# Patient Record
Sex: MALE | Race: WHITE | Employment: FULL TIME | URBAN - METROPOLITAN AREA
[De-identification: names, ages, dates, MRNs, and addresses within clinical notes are randomized per-mention and may not be internally consistent; named-entity substitution may affect disease eponyms.]

---

## 2018-08-07 PROCEDURE — 93005 ELECTROCARDIOGRAM TRACING: CPT | Performed by: EMERGENCY MEDICINE

## 2018-08-07 PROCEDURE — 96360 HYDRATION IV INFUSION INIT: CPT | Performed by: EMERGENCY MEDICINE

## 2018-08-07 PROCEDURE — 99284 EMERGENCY DEPT VISIT MOD MDM: CPT | Mod: 25 | Performed by: EMERGENCY MEDICINE

## 2018-08-07 PROCEDURE — 96361 HYDRATE IV INFUSION ADD-ON: CPT | Performed by: EMERGENCY MEDICINE

## 2018-08-07 PROCEDURE — 93010 ELECTROCARDIOGRAM REPORT: CPT | Mod: Z6 | Performed by: EMERGENCY MEDICINE

## 2018-08-07 RX ORDER — MULTIVITAMIN,THERAPEUTIC
1 TABLET ORAL DAILY
COMMUNITY

## 2018-08-07 RX ORDER — DIMENHYDRINATE 50 MG
1 TABLET ORAL DAILY
COMMUNITY

## 2018-08-08 ENCOUNTER — HOSPITAL ENCOUNTER (EMERGENCY)
Facility: CLINIC | Age: 28
Discharge: HOME | End: 2018-08-08
Attending: EMERGENCY MEDICINE | Admitting: EMERGENCY MEDICINE
Payer: COMMERCIAL

## 2018-08-08 VITALS
DIASTOLIC BLOOD PRESSURE: 85 MMHG | OXYGEN SATURATION: 97 % | SYSTOLIC BLOOD PRESSURE: 124 MMHG | HEART RATE: 90 BPM | HEIGHT: 73 IN | RESPIRATION RATE: 20 BRPM | TEMPERATURE: 98.7 F

## 2018-08-08 DIAGNOSIS — R07.9 ACUTE CHEST PAIN: ICD-10-CM

## 2018-08-08 DIAGNOSIS — R42 DIZZINESS: ICD-10-CM

## 2018-08-08 LAB
ANION GAP BLD CALC-SCNC: 6 MMOL/L (ref 6–17)
BASOPHILS # BLD AUTO: 0 10E9/L (ref 0–0.2)
BASOPHILS NFR BLD AUTO: 0.1 %
BUN SERPL-MCNC: 16 MG/DL (ref 7–30)
CALCIUM SERPL-MCNC: 9.3 MG/DL (ref 8.5–10.1)
CHLORIDE BLD-SCNC: 105 MMOL/L (ref 94–109)
CO2 BLD-SCNC: 30 MMOL/L (ref 20–32)
CREAT SERPL-MCNC: 0.97 MG/DL (ref 0.66–1.25)
DIFFERENTIAL METHOD BLD: NORMAL
EOSINOPHIL # BLD AUTO: 0.1 10E9/L (ref 0–0.7)
EOSINOPHIL NFR BLD AUTO: 1.7 %
ERYTHROCYTE [DISTWIDTH] IN BLOOD BY AUTOMATED COUNT: 12.8 % (ref 10–15)
GFR SERPL CREATININE-BSD FRML MDRD: >90 ML/MIN/1.7M2
GLUCOSE BLD-MCNC: 105 MG/DL (ref 70–99)
HCT VFR BLD AUTO: 44 % (ref 40–53)
HGB BLD-MCNC: 14.9 G/DL (ref 13.3–17.7)
IMM GRANULOCYTES # BLD: 0 10E9/L (ref 0–0.4)
IMM GRANULOCYTES NFR BLD: 0.3 %
INTERPRETATION ECG - MUSE: NORMAL
LYMPHOCYTES # BLD AUTO: 2.5 10E9/L (ref 0.8–5.3)
LYMPHOCYTES NFR BLD AUTO: 32.8 %
MCH RBC QN AUTO: 28.1 PG (ref 26.5–33)
MCHC RBC AUTO-ENTMCNC: 33.9 G/DL (ref 31.5–36.5)
MCV RBC AUTO: 83 FL (ref 78–100)
MONOCYTES # BLD AUTO: 0.5 10E9/L (ref 0–1.3)
MONOCYTES NFR BLD AUTO: 6.6 %
NEUTROPHILS # BLD AUTO: 4.4 10E9/L (ref 1.6–8.3)
NEUTROPHILS NFR BLD AUTO: 58.5 %
NRBC # BLD AUTO: 0 10*3/UL
NRBC BLD AUTO-RTO: 0 /100
PLATELET # BLD AUTO: 210 10E9/L (ref 150–450)
POTASSIUM BLD-SCNC: 4.2 MMOL/L (ref 3.4–5.3)
RBC # BLD AUTO: 5.3 10E12/L (ref 4.4–5.9)
SODIUM BLD-SCNC: 141 MMOL/L (ref 133–144)
T4 FREE SERPL-MCNC: 1.19 NG/DL (ref 0.76–1.46)
TROPONIN I SERPL-MCNC: <0.015 UG/L (ref 0–0.04)
TSH SERPL DL<=0.005 MIU/L-ACNC: 6.05 MU/L (ref 0.4–4)
WBC # BLD AUTO: 7.6 10E9/L (ref 4–11)

## 2018-08-08 PROCEDURE — 84439 ASSAY OF FREE THYROXINE: CPT | Performed by: EMERGENCY MEDICINE

## 2018-08-08 PROCEDURE — 82565 ASSAY OF CREATININE: CPT | Performed by: EMERGENCY MEDICINE

## 2018-08-08 PROCEDURE — 25000132 ZZH RX MED GY IP 250 OP 250 PS 637: Performed by: EMERGENCY MEDICINE

## 2018-08-08 PROCEDURE — 85025 COMPLETE CBC W/AUTO DIFF WBC: CPT | Performed by: EMERGENCY MEDICINE

## 2018-08-08 PROCEDURE — 25000128 H RX IP 250 OP 636: Performed by: EMERGENCY MEDICINE

## 2018-08-08 PROCEDURE — 84520 ASSAY OF UREA NITROGEN: CPT | Performed by: EMERGENCY MEDICINE

## 2018-08-08 PROCEDURE — 84443 ASSAY THYROID STIM HORMONE: CPT | Performed by: EMERGENCY MEDICINE

## 2018-08-08 PROCEDURE — 82947 ASSAY GLUCOSE BLOOD QUANT: CPT | Performed by: EMERGENCY MEDICINE

## 2018-08-08 PROCEDURE — 84484 ASSAY OF TROPONIN QUANT: CPT | Performed by: EMERGENCY MEDICINE

## 2018-08-08 PROCEDURE — 82310 ASSAY OF CALCIUM: CPT | Performed by: EMERGENCY MEDICINE

## 2018-08-08 PROCEDURE — 80051 ELECTROLYTE PANEL: CPT | Performed by: EMERGENCY MEDICINE

## 2018-08-08 RX ORDER — ASPIRIN 81 MG/1
324 TABLET, CHEWABLE ORAL ONCE
Status: COMPLETED | OUTPATIENT
Start: 2018-08-08 | End: 2018-08-08

## 2018-08-08 RX ORDER — PLANT STANOL ESTER 450 MG
1 TABLET ORAL DAILY
COMMUNITY

## 2018-08-08 RX ADMIN — SODIUM CHLORIDE 1000 ML: 9 INJECTION, SOLUTION INTRAVENOUS at 02:18

## 2018-08-08 RX ADMIN — ASPIRIN 81 MG CHEWABLE TABLET 324 MG: 81 TABLET CHEWABLE at 02:05

## 2018-08-08 NOTE — ED PROVIDER NOTES
"  History     Chief Complaint   Patient presents with     Dizziness     \"feel dizzy and feel like there is a weight in the middle of my head\". \"My (R) eye feels off slightly from my other eye\". Has been going on for a couple months. Worse tonight.     Generalized Weakness     HPI  Meño Rodriguez is a 28 year old male who presents with his wife to the Emergency Department for evaluation of dizziness worsened around 6pm, has somewhat been present for 2 months. No room spinning. He also reports generalized weakness and feeling like he is moving slow sometimes. He reports chest aches that may be secondary to acid reflux and denies any sharp pain. He states he has been \"feeling off\" for the past couple months. He states his \"fingers are more lethargic\" than usual. He reports an intermittent headache on the right side. He reports a little cough. The patient reports right eye \"slightly out of focus\" sometimes along with left eye twitch, denies double vision or \"blank spots\". No eye pain, no eye trauma. He reports intermittent heart palpitations during the course of two months. He reports bouts with gastrointestinal problems for which he recently saw his PCP for. He denies fevers. He denies vomiting. Denies appetite change. He reports no other medical conditions, and notes that he takes over the counter supplements and denies other drug use.     I have reviewed the Medications, Allergies, Past Medical and Surgical History, and Social History in the [employer] system.  History reviewed. No pertinent past medical history.    History reviewed. No pertinent surgical history.    No family history on file.    Social History   Substance Use Topics     Smoking status: Never Smoker     Smokeless tobacco: Never Used     Alcohol use No       Current Facility-Administered Medications   Medication     lidocaine (viscous) (XYLOCAINE) 2 % 15 mL, alum & mag hydroxide-simethicone (MYLANTA ES/MAALOX  ES) 15 mL GI Cocktail     Current Outpatient " "Prescriptions   Medication     Flaxseed, Linseed, (FLAX SEED OIL) 1000 MG capsule     multivitamin, therapeutic (THERA-VIT) TABS tablet     potassium gluconate 2.5 MEQ TABS tablet        Allergies   Allergen Reactions     Amoxicillin        Review of Systems  A 10-system review of systems was completed with pertinent positives and negatives noted in the HPI, otherwise negative.     Physical Exam   BP: 140/86  Pulse: 80  Heart Rate: 74  Temp: 98.7  F (37.1  C)  Resp: 18  Height: 185.4 cm (6' 1\")  SpO2: 99 %      Physical Exam  /85  Pulse 90  Temp 98.7  F (37.1  C) (Oral)  Resp 20  Ht 1.854 m (6' 1\")  SpO2 97%  General: well-appearing, no acute distress  HENT: MMM, no oropharyngeal lesions. TMs clear.   Eyes: PERRL, normal sclerae, no conjunctival pallor. Wears glasses.   Neck: non-tender, supple  Cardio: RRR, normal heart sounds, extremities well perfused  Resp: Normal work of breathing, clear breath sounds  Chest/Back: no visual signs of trauma, no CVA tenderness  Abdomen: no tenderness, non-distended, no rebound, no guarding  Neuro: alert and fully oriented. No confusion. CN II-XII intact to testing. Strength left: 5/5 , 5/5 elbow flexion, 5/5 elbow extension, 5/5 shoulder abduction, 5/5 hip flexion, 5/5 knee flexion, 5/5 knee extension. Strength right: 5/5 , 5/5 elbow flexion, 5/5 elbow extension, 5/5 shoulder abduction, 5/5 hip flexion, 5/5 knee flexion, 5/5 knee extension. Sensation intact to soft touch in all extremities. No pronator drift. 2+ brachial and patellar reflexes. Normal tone, no clonus. Normal coordination with finger-nose. Normal standard gait, normal in-line gait. Romberg negative.   MSK: no deformities.   Integumentary/Skin: no rash, normal color  Psych: anxious affect, normal behavior    ED Course     ED Course     Procedures             EKG Interpretation:      Interpreted by Jr Conklin  Time reviewed: 0020  Symptoms at time of EKG: lightheadedness   Rhythm: normal " "sinus   Rate: normal  Axis: normal  Ectopy: one PAC  Conduction: normal  ST Segments/ T Waves: No ST-T wave changes  Q Waves: none  Comparison to prior: No old EKG available    Clinical Impression: normal EKG    Critical Care time:  none       Labs Ordered and Resulted from Time of ED Arrival Up to the Time of Departure from the ED   TSH WITH FREE T4 REFLEX - Abnormal; Notable for the following:        Result Value    TSH 6.05 (*)     All other components within normal limits   GLUCOSE WHOLE BLOOD - Abnormal; Notable for the following:     Glucose 105 (*)     All other components within normal limits   CBC WITH PLATELETS DIFFERENTIAL   TROPONIN I   UREA NITROGEN   CALCIUM   CREATININE   ELECTROLYTE PANEL WHOLE BLOOD   T4 FREE   PERIPHERAL IV CATHETER   CARDIAC CONTINUOUS MONITORING            Assessments & Plan (with Medical Decision Making)   In the ED, the patient was afebrile and hemodynamically stable. History notable for 2 months occasional dizziness and fairly non-specific \"feeling off\", also chest \"aching\" pain for about 6 hours prior to arrival. Exam notable for non-focal neuro exam with intact coordination and normal gait.     ECG showed NSR without evidence of acute ischemia, troponin wnl - ACS very unlikely. No arrhythmia noted on several hours telemetry monitoring in the ED. PERC met - PE very unlikely. Symptoms improved with GI cocktail.     No objective coordination deficit nor other focal neuro deficit on thorough neuro exam to suggest cerebellar CVA or other intracranial pathology. Vision intact. Hgb wnl. Electrolytes wnl. TSH was mildly elevated, raising suspicion for hypothyroidism but T4 was wnl. Patient might be having symptoms related to borderline hypothyroidism. Patient did have improvement in lightheadedness symptoms with NS bolus.     After counseling on the diagnosis, work-up, and treatment plan, the patient was discharged to home. The patient was advised to follow-up with his PCP in a few " days. The patient was advised to return to the ED if worsening symptoms, weakness, or if there are any urgent/life-threatening concerns.       Clinical Impression:  Dizziness of unclear etiology   Aching chest pain       Jr Conklin MD  Emergency Medicine       I have reviewed the nursing notes.    I have reviewed the findings, diagnosis, plan and need for follow up with the patient.    New Prescriptions    No medications on file       Final diagnoses:   Dizziness   Acute chest pain     I, Brooke Goode, am serving as a trained medical scribe to document services personally performed by Jr Conklin MD, based on the provider's statements to me.   I, Jr Conklin MD, was physically present and have reviewed and verified the accuracy of this note documented by Brooke Goode.    8/7/2018   [facility], [place], EMERGENCY DEPARTMENT     Jr Conklin MD  08/08/18 4746

## 2018-08-08 NOTE — DISCHARGE INSTRUCTIONS
Please make an appointment to follow up with Your Primary Care Provider in a few days    Return to the ED if you are having fever, specific arm/leg weakness, worsening symptoms, or any other urgent/life-threatening concerns.

## 2018-08-08 NOTE — ED AVS SNAPSHOT
[facility], Emergency Department    [address]    [place] [address]    Phone:  [phone]    Fax:  [phone]                                       Meño Rodriguez   MRN: [id number]    Department:  [facility], Emergency Department   Date of Visit:  8/7/2018           Patient Information     Date Of Birth          1990        Your diagnoses for this visit were:     Dizziness     Acute chest pain        You were seen by Jr Conklin MD.        Discharge Instructions       Please make an appointment to follow up with Your Primary Care Provider in a few days    Return to the ED if you are having fever, specific arm/leg weakness, worsening symptoms, or any other urgent/life-threatening concerns.       24 Hour Appointment Hotline       To make an appointment at any [facility] clinic, call [phone] ([phone]). If you don't have a family doctor or clinic, we will help you find one. [facility] clinics are conveniently located to serve the needs of you and your family.             Review of your medicines      Our records show that you are taking the medicines listed below. If these are incorrect, please call your family doctor or clinic.        Dose / Directions Last dose taken    flax seed oil 1000 MG capsule   Dose:  1 capsule        Take 1 capsule by mouth daily   Refills:  0        multivitamin, therapeutic Tabs tablet   Dose:  1 tablet        Take 1 tablet by mouth daily   Refills:  0        potassium gluconate 2.5 MEQ Tabs tablet   Dose:  1 mEq        Take 1 mEq by mouth daily   Refills:  0                Procedures and tests performed during your visit     CBC with platelets differential    Calcium    Creatinine    EKG 12 lead    Electrolyte Panel Whole Blood    Glucose whole blood    Peripheral IV catheter    T4 free    TSH with free T4 reflex    Troponin I    Urea nitrogen      Orders Needing Specimen Collection     None      Pending Results     No orders found from  "2018 to 2018.            Pending Culture Results     No orders found from 2018 to 2018.            Pending Results Instructions     If you had any lab results that were not finalized at the time of your Discharge, you can call the ED Lab Result RN at [phone]. You will be contacted by this team for any positive Lab results or changes in treatment. The nurses are available 7 days a week from 10A to 6:30P.  You can leave a message 24 hours per day and they will return your call.        Thank you for choosing [facility]       Thank you for choosing [facility] for your care. Our goal is always to provide you with excellent care. Hearing back from our patients is one way we can continue to improve our services. Please take a few minutes to complete the written survey that you may receive in the mail after you visit with us. Thank you!        [employer] Information     [employer] lets you send messages to your doctor, view your test results, renew your prescriptions, schedule appointments and more. To sign up, go to [email or web address] . Click on \"Log in\" on the left side of the screen, which will take you to the Welcome page. Then click on \"Sign up Now\" on the right side of the page.     You will be asked to enter the access code listed below, as well as some personal information. Please follow the directions to create your username and password.     Your access code is: [email or web address]  Expires: 2018  4:31 AM     Your access code will  in 90 days. If you need help or a new code, please call your [facility] clinic or [phone].        Care EveryWhere ID     This is your Care EveryWhere ID. This could be used by other organizations to access your [facility] medical records  [id number]        Equal Access to Services     PAVITHRA ORANTES : Matthias Shaw, nirmala jones, moody olivas. So [facility] [phone].    ATENCIÓN: Si habla " español, tiene a luna disposición servicios gratuitos de asistencia lingüística. Lizbeth al [phone].    We comply with applicable federal civil rights laws and [place] laws. We do not discriminate on the basis of race, color, national origin, age, disability, sex, sexual orientation, or gender identity.            After Visit Summary       This is your record. Keep this with you and show to your community pharmacist(s) and doctor(s) at your next visit.

## 2018-08-08 NOTE — ED AVS SNAPSHOT
[facility], Emergency Department    [address]    [facility] [address]    Phone:  [phone]    Fax:  [phone]                                       Meño Rodriguez   MRN: [id number]    Department:  [facility], Emergency Department   Date of Visit:  8/7/2018           After Visit Summary Signature Page     I have received my discharge instructions, and my questions have been answered. I have discussed any challenges I see with this plan with the nurse or doctor.    ..........................................................................................................................................  Patient/Patient Representative Signature      ..........................................................................................................................................  Patient Representative Print Name and Relationship to Patient    ..................................................               ................................................  Date                                            Time    ..........................................................................................................................................  Reviewed by Signature/Title    ...................................................              ..............................................  Date                                                            Time